# Patient Record
Sex: FEMALE | Race: WHITE | Employment: STUDENT | URBAN - METROPOLITAN AREA
[De-identification: names, ages, dates, MRNs, and addresses within clinical notes are randomized per-mention and may not be internally consistent; named-entity substitution may affect disease eponyms.]

---

## 2019-03-20 ENCOUNTER — THERAPY VISIT (OUTPATIENT)
Dept: PHYSICAL THERAPY | Facility: CLINIC | Age: 19
End: 2019-03-20
Payer: COMMERCIAL

## 2019-03-20 DIAGNOSIS — M62.830 SPASM OF THORACIC BACK MUSCLE: ICD-10-CM

## 2019-03-20 DIAGNOSIS — M54.2 NECK PAIN: ICD-10-CM

## 2019-03-20 PROCEDURE — 97140 MANUAL THERAPY 1/> REGIONS: CPT | Mod: GP | Performed by: PHYSICAL THERAPIST

## 2019-03-20 PROCEDURE — 97163 PT EVAL HIGH COMPLEX 45 MIN: CPT | Mod: GP | Performed by: PHYSICAL THERAPIST

## 2019-03-20 PROCEDURE — 97110 THERAPEUTIC EXERCISES: CPT | Mod: GP | Performed by: PHYSICAL THERAPIST

## 2019-03-20 NOTE — LETTER
[facility]  [address] [address]  [phone]    2019    Re: Leandra Card   :   2000  MRN:  [id number]   REFERRING PHYSICIAN:   Sangeeta John    [facility]    Date of Initial Evaluation:  19  Visits:  Rxs Used: 1  Reason for Referral:     Spasm of thoracic back muscle  Neck pain    [facility] Initial Evaluation    Subjective:  The history is provided by the patient.   Leandra Card is a 19 year old female with a cervical spine and thoracic spine condition.  Condition occurred with:  Insidious onset.  Condition occurred: at home.  This is a new condition  Date of orders 3/19/19  Pt reports ongoing neck/back pain from time to time. 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 Last Wed woke up at 4 am with severe pain in her L scapula, which worsened throughout the day. Went to the ER after work d/t severity of pain and was prescribed pain meds and gentle stretching. No change in the last week. Unable to attend class or work.  Social: R hand dominant, dancer, sophomore in college.    Patient reports pain:  Thoracic left side, central cervical spine, lower cervical spine, upper cervical spine and mid thoracic.  Radiates to:  None.  Pain is described as aching, cramping, sharp and shooting and is constant and reported as 9/10 and 5/10.  Associated symptoms:  Loss of motion/stiffness. Pain is the same all the time.  Symptoms are exacerbated by certain positions, rotating head, lifting and change of position and relieved by ice, heat, NSAID's and analgesics.  Since onset symptoms are unchanged.  Special tests:  CT scan.      General health as reported by patient is good.       Objective:  Standing Alignment:    Cervical/Thoracic:  Forward head and thoracic kyphosis decreased    Gait:  Started eval with  patient laying supine d/t severity of pain. Guarded transfers, guarded movement    Cervical/Thoracic Evaluation  AROM:  AROM Cervical:  Flexion:            Produce L scap pain, major loss motion  Extension:       Major loss, apprehensive  Re: Leandra Card   :   2000    Rotation:         Left: Major loss, produce pain     Right: Major loss  Side Bend:      Left: min -     Right:  Mod - and produces L pain    Cervical Palpation:    Tenderness present at Left:    Sternocleidomstoid; Scalenes; Upper Trap; Levator; Erector Spinae; Facet and Suboccipitals  Tenderness present at Right:    Sternocleidomstoid; Scalenes; Upper Trap; Levator; Erector Spinae; Facet and Suboccipitals    Assessment/Plan:    Patient is a 19 year old female with cervical and thoracic complaints.    Patient has the following significant findings with corresponding treatment plan.                Diagnosis 1:  Cervicothoracic muscle spasm w/o radiation  Pain -  hot/cold therapy, mechanical traction, manual therapy, splint/taping/bracing/orthotics, self management, education, directional preference exercise and home program  Decreased ROM/flexibility - manual therapy, therapeutic exercise and home program  Decreased joint mobility - manual therapy, therapeutic exercise and home program  Decreased proprioception - neuro re-education, therapeutic activities and home program  Impaired posture - neuro re-education, therapeutic activities and home program    Therapy Evaluation Codes:   1) History comprised of:   Personal factors that impact the plan of care:      Age, Anxiety, Coping style, Living environment, Overall behavior pattern, Time   since onset of symptoms and Work status.    Comorbidity factors that impact the plan of care are:      None.     Medications impacting care: Anti-inflammatory, Muscle relaxant and Pain.  2) Examination of Body Systems comprised of:   Body structures and functions that impact the plan of care:      Cervical  spine and Thoracic Spine.   Activity limitations that impact the plan of care are:      Bathing, Bending, Cooking, Driving, Dressing, Jumping, Lifting,   Reading/Computer work, Running, Sitting, Standing, Sleeping and Laying down.  3) Clinical presentation characteristics are:   Unstable/Unpredictable.  4) Decision-Making    High complexity using standardized patient assessment instrument and/or   measureable assessment of functional outcome.  Cumulative Therapy Evaluation is: High complexity.    Previous and current functional limitations:  (See Goal Flow Sheet for this information)    Short term and Long term goals: (See Goal Flow Sheet for this information)     Communication ability:  Patient appears to be able to clearly communicate and understand verbal and written communication and follow directions correctly.  Treatment Explanation - The following has been discussed with the patient:   RX ordered/plan of care  Anticipated outcomes  Re: Leandra Card   :   2000    Possible risks and side effects  This patient would benefit from PT intervention to resume normal activities.   Rehab potential is questionable due to lack of pain management.    Frequency:  2 X a week, once daily  Duration:  for 3 weeks tapering to 1 X a week over 4 weeks  Discharge Plan:  Achieve all LTG.  Independent in home treatment program.  Reach maximal therapeutic benefit.    Please refer to the daily flowsheet for treatment today, total treatment time and time spent performing 1:1 timed codes.         Thank you for your referral.    INQUIRIES  Therapist: Felipa Watson DPT  [facility] PHYSICAL THERAPY  [address] [place] [id number]  Phone: [phone]  Fax: [phone]

## 2019-03-21 PROBLEM — M54.2 NECK PAIN: Status: ACTIVE | Noted: 2019-03-21

## 2019-03-21 PROBLEM — M62.830 SPASM OF THORACIC BACK MUSCLE: Status: ACTIVE | Noted: 2019-03-21

## 2019-03-21 NOTE — PROGRESS NOTES
[facility] Initial Evaluation  Subjective:  The history is provided by the patient.   Leandra Card is a 19 year old female with a cervical spine and thoracic spine condition.  Condition occurred with:  Insidious onset.  Condition occurred: at home.  This is a new condition  Date of orders 3/19/19    Pt reports ongoing neck/back pain from time to time. 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 Last Wed woke up at 4 am with severe pain in her L scapula, which worsened throughout the day. Went to the ER after work d/t severity of pain and was prescribed pain meds and gentle stretching. No change in the last week. Unable to attend class or work.    Social: R hand dominant, dancer, sophomore in college.    Patient reports pain:  Thoracic left side, central cervical spine, lower cervical spine, upper cervical spine and mid thoracic.  Radiates to:  None.  Pain is described as aching, cramping, sharp and shooting and is constant and reported as 9/10 and 5/10.  Associated symptoms:  Loss of motion/stiffness. Pain is the same all the time.  Symptoms are exacerbated by certain positions, rotating head, lifting and change of position and relieved by ice, heat, NSAID's and analgesics.  Since onset symptoms are unchanged.  Special tests:  CT scan.      General health as reported by patient is good.                                              Objective:  Standing Alignment:    Cervical/Thoracic:  Forward head and thoracic kyphosis decreased                Gait:  Started eval with patient laying supine d/t severity of pain. Guarded transfers, guarded movement                        Cervical/Thoracic Evaluation    AROM:  AROM Cervical:    Flexion:            Produce L scap pain, major loss motion  Extension:       Major loss, apprehensive  Rotation:         Left: Major loss, produce pain     Right: Major loss  Side Bend:      Left: min -     Right:  Mod -  and produces L pain                Cervical Palpation:    Tenderness present at Left:    Sternocleidomstoid; Scalenes; Upper Trap; Levator; Erector Spinae; Facet and Suboccipitals  Tenderness present at Right:    Sternocleidomstoid; Scalenes; Upper Trap; Levator; Erector Spinae; Facet and Suboccipitals                                                  General     ROS    Assessment/Plan:    Patient is a 19 year old female with cervical and thoracic complaints.    Patient has the following significant findings with corresponding treatment plan.                Diagnosis 1:  Cervicothoracic muscle spasm w/o radiation  Pain -  hot/cold therapy, mechanical traction, manual therapy, splint/taping/bracing/orthotics, self management, education, directional preference exercise and home program  Decreased ROM/flexibility - manual therapy, therapeutic exercise and home program  Decreased joint mobility - manual therapy, therapeutic exercise and home program  Decreased proprioception - neuro re-education, therapeutic activities and home program  Impaired posture - neuro re-education, therapeutic activities and home program    Therapy Evaluation Codes:   1) History comprised of:   Personal factors that impact the plan of care:      Age, Anxiety, Coping style, Living environment, Overall behavior pattern, Time since onset of symptoms and Work status.    Comorbidity factors that impact the plan of care are:      None.     Medications impacting care: Anti-inflammatory, Muscle relaxant and Pain.  2) Examination of Body Systems comprised of:   Body structures and functions that impact the plan of care:      Cervical spine and Thoracic Spine.   Activity limitations that impact the plan of care are:      Bathing, Bending, Cooking, Driving, Dressing, Jumping, Lifting, Reading/Computer work, Running, Sitting, Standing, Sleeping and Laying down.  3) Clinical presentation characteristics  are:   Unstable/Unpredictable.  4) Decision-Making    High complexity using standardized patient assessment instrument and/or measureable assessment of functional outcome.  Cumulative Therapy Evaluation is: High complexity.    Previous and current functional limitations:  (See Goal Flow Sheet for this information)    Short term and Long term goals: (See Goal Flow Sheet for this information)     Communication ability:  Patient appears to be able to clearly communicate and understand verbal and written communication and follow directions correctly.  Treatment Explanation - The following has been discussed with the patient:   RX ordered/plan of care  Anticipated outcomes  Possible risks and side effects  This patient would benefit from PT intervention to resume normal activities.   Rehab potential is questionable due to lack of pain management.    Frequency:  2 X a week, once daily  Duration:  for 3 weeks tapering to 1 X a week over 4 weeks  Discharge Plan:  Achieve all LTG.  Independent in home treatment program.  Reach maximal therapeutic benefit.    Please refer to the daily flowsheet for treatment today, total treatment time and time spent performing 1:1 timed codes.

## 2019-12-11 PROBLEM — M54.2 NECK PAIN: Status: RESOLVED | Noted: 2019-03-21 | Resolved: 2019-12-11

## 2019-12-11 PROBLEM — M62.830 SPASM OF THORACIC BACK MUSCLE: Status: RESOLVED | Noted: 2019-03-21 | Resolved: 2019-12-11
